# Patient Record
Sex: FEMALE | Race: WHITE | NOT HISPANIC OR LATINO | Employment: FULL TIME | URBAN - METROPOLITAN AREA
[De-identification: names, ages, dates, MRNs, and addresses within clinical notes are randomized per-mention and may not be internally consistent; named-entity substitution may affect disease eponyms.]

---

## 2017-08-04 RX ORDER — VENLAFAXINE HYDROCHLORIDE 150 MG/1
CAPSULE, EXTENDED RELEASE ORAL
Qty: 90 CAPSULE | Refills: 3 | OUTPATIENT
Start: 2017-08-04

## 2017-08-08 ENCOUNTER — OFFICE VISIT (OUTPATIENT)
Dept: FAMILY MEDICINE CLINIC | Facility: CLINIC | Age: 37
End: 2017-08-08

## 2017-08-08 VITALS
TEMPERATURE: 98.6 F | BODY MASS INDEX: 28.19 KG/M2 | DIASTOLIC BLOOD PRESSURE: 80 MMHG | HEART RATE: 80 BPM | WEIGHT: 186 LBS | RESPIRATION RATE: 20 BRPM | SYSTOLIC BLOOD PRESSURE: 124 MMHG | HEIGHT: 68 IN

## 2017-08-08 DIAGNOSIS — F33.40 RECURRENT MAJOR DEPRESSIVE DISORDER, IN REMISSION (HCC): Primary | ICD-10-CM

## 2017-08-08 PROCEDURE — 99213 OFFICE O/P EST LOW 20 MIN: CPT | Performed by: NURSE PRACTITIONER

## 2017-08-08 RX ORDER — LEVOCETIRIZINE DIHYDROCHLORIDE 5 MG/1
5 TABLET, FILM COATED ORAL EVERY EVENING
COMMUNITY

## 2017-08-08 RX ORDER — VENLAFAXINE HYDROCHLORIDE 150 MG/1
150 CAPSULE, EXTENDED RELEASE ORAL DAILY
Qty: 90 CAPSULE | Refills: 4 | Status: SHIPPED | OUTPATIENT
Start: 2017-08-08 | End: 2018-10-30 | Stop reason: SDUPTHER

## 2017-08-08 NOTE — PROGRESS NOTES
Subjective   Hilaria Hanna is a 36 y.o. female.     History of Present Illness   Needs refill on depression medication  Doing well  No SI/HI, sleeping well, not isolating, no excessive fatigue      The following portions of the patient's history were reviewed and updated as appropriate: allergies, current medications, past family history, past medical history, past social history, past surgical history and problem list.    Review of Systems   Constitutional: Negative.    HENT: Negative.    Eyes: Negative.    Respiratory: Negative.    Cardiovascular: Negative.    Gastrointestinal: Negative.    Endocrine: Negative.    Genitourinary: Negative.    Musculoskeletal: Negative.    Skin: Negative.    Allergic/Immunologic: Negative.    Neurological: Negative.    Hematological: Negative.    Psychiatric/Behavioral: Negative.        Objective   Physical Exam   Constitutional: She appears well-developed and well-nourished. No distress.   HENT:   Head: Normocephalic.   Right Ear: External ear normal.   Left Ear: External ear normal.   Nose: Nose normal.   Neck: Normal range of motion. Neck supple. No thyromegaly present.   Cardiovascular: Normal rate, regular rhythm and normal heart sounds.    Pulmonary/Chest: Effort normal and breath sounds normal.   Abdominal: Soft. Normal appearance. There is no hepatosplenomegaly. There is no CVA tenderness.   Lymphadenopathy:     She has no cervical adenopathy.   Neurological: She is alert.   Skin: Skin is warm and dry.   Psychiatric: She has a normal mood and affect. Her behavior is normal. Judgment and thought content normal.   Nursing note and vitals reviewed.      Assessment/Plan   Hilaria was seen today for depression.    Diagnoses and all orders for this visit:    Recurrent major depressive disorder, in remission    Other orders  -     venlafaxine XR (EFFEXOR-XR) 150 MG 24 hr capsule; Take 1 capsule by mouth Daily.    Will refill meds and see pt back in 12 months or sooner if  needed.

## 2017-08-14 RX ORDER — LEVONORGESTREL / ETHINYL ESTRADIOL AND ETHINYL ESTRADIOL 150-30(84)
KIT ORAL
Qty: 91 EACH | Refills: 2 | Status: SHIPPED | OUTPATIENT
Start: 2017-08-14 | End: 2019-05-13

## 2018-10-30 RX ORDER — VENLAFAXINE HYDROCHLORIDE 150 MG/1
CAPSULE, EXTENDED RELEASE ORAL
Qty: 90 CAPSULE | Refills: 1 | Status: SHIPPED | OUTPATIENT
Start: 2018-10-30 | End: 2019-04-28 | Stop reason: SDUPTHER

## 2019-04-30 RX ORDER — VENLAFAXINE HYDROCHLORIDE 150 MG/1
CAPSULE, EXTENDED RELEASE ORAL
Qty: 90 CAPSULE | Refills: 0 | Status: SHIPPED | OUTPATIENT
Start: 2019-04-30 | End: 2019-05-13 | Stop reason: SDUPTHER

## 2019-05-13 ENCOUNTER — OFFICE VISIT (OUTPATIENT)
Dept: FAMILY MEDICINE CLINIC | Facility: CLINIC | Age: 39
End: 2019-05-13

## 2019-05-13 VITALS
SYSTOLIC BLOOD PRESSURE: 112 MMHG | BODY MASS INDEX: 28.11 KG/M2 | RESPIRATION RATE: 16 BRPM | WEIGHT: 185.5 LBS | DIASTOLIC BLOOD PRESSURE: 72 MMHG | HEART RATE: 88 BPM | TEMPERATURE: 97.9 F | HEIGHT: 68 IN

## 2019-05-13 DIAGNOSIS — Z01.419 PAP SMEAR, AS PART OF ROUTINE GYNECOLOGICAL EXAMINATION: ICD-10-CM

## 2019-05-13 DIAGNOSIS — Z13.6 ENCOUNTER FOR LIPID SCREENING FOR CARDIOVASCULAR DISEASE: ICD-10-CM

## 2019-05-13 DIAGNOSIS — F33.40 RECURRENT MAJOR DEPRESSIVE DISORDER, IN REMISSION (HCC): ICD-10-CM

## 2019-05-13 DIAGNOSIS — M25.50 ARTHRALGIA, UNSPECIFIED JOINT: ICD-10-CM

## 2019-05-13 DIAGNOSIS — E55.9 VITAMIN D DEFICIENCY: ICD-10-CM

## 2019-05-13 DIAGNOSIS — R53.82 CHRONIC FATIGUE: ICD-10-CM

## 2019-05-13 DIAGNOSIS — Z13.220 ENCOUNTER FOR LIPID SCREENING FOR CARDIOVASCULAR DISEASE: ICD-10-CM

## 2019-05-13 DIAGNOSIS — F33.1 MODERATE EPISODE OF RECURRENT MAJOR DEPRESSIVE DISORDER (HCC): ICD-10-CM

## 2019-05-13 DIAGNOSIS — Z00.00 ENCOUNTER FOR WELL ADULT EXAM WITHOUT ABNORMAL FINDINGS: Primary | ICD-10-CM

## 2019-05-13 PROCEDURE — 99395 PREV VISIT EST AGE 18-39: CPT | Performed by: PHYSICIAN ASSISTANT

## 2019-05-13 RX ORDER — VENLAFAXINE HYDROCHLORIDE 150 MG/1
150 CAPSULE, EXTENDED RELEASE ORAL DAILY
Qty: 90 CAPSULE | Refills: 1 | Status: SHIPPED | OUTPATIENT
Start: 2019-05-13 | End: 2020-01-27

## 2019-05-13 NOTE — PROGRESS NOTES
Chief Complaint   Patient presents with   • physical and pap     sore joints and fatigue       Subjective   The patient is a 38 y.o. female who presents for annual wellness visit      Nutrition:  well balanced. Vegetarian X 8 years. Takes multivitamin   Exercise:  run, 3 X per week  Sleep: 7-8 hours per night, never feels rested      Past Gynecological History:     Patient's last menstrual period was 04/29/2019.     Menses: regular every 28-30 days, heavy. Lasting 5-6 days.      Contraception: condoms. Was on OCP in the past- did not like how it made her feel      Sexually transmitted disease history: none     Pap History:was done on approximately 3 years ago  and the result was: normal PAP.      Date of last mammogram: she has never had a mammogram    Past Medical History,Medications, Allergies reviewed.       HPI  Joint pain in elbows, knees, shoulders, neck since March. No injury, no strenuous activity. Just feels achy.   Sometimes feels like she is swelling   Family hx of hashimoto's thyroiditis (mother) and lupus (2 cousins and aunt on father's side)   No known hx of RA.  Cycles have been very heavy   Very fatigued     Bad seasonal allergies. But used to this     No change in mood. Has been stable on Effexor. Needs refill     Review of Systems   Constitutional: Positive for fatigue. Negative for chills, diaphoresis and fever.   HENT: Negative.  Negative for congestion, ear discharge, ear pain, hearing loss, nosebleeds, postnasal drip, sinus pressure, sneezing and sore throat.    Eyes: Negative.    Respiratory: Negative.  Negative for cough, chest tightness, shortness of breath and wheezing.    Cardiovascular: Negative.  Negative for chest pain, palpitations and leg swelling.   Gastrointestinal: Negative for abdominal distention, abdominal pain, blood in stool, constipation, diarrhea, nausea and vomiting.   Genitourinary: Negative.  Negative for difficulty urinating, dysuria, flank pain, frequency, hematuria and  "urgency.   Musculoskeletal: Positive for arthralgias. Negative for back pain, gait problem, joint swelling, myalgias, neck pain and neck stiffness.   Skin: Negative.  Negative for color change, pallor, rash and wound.   Allergic/Immunologic: Positive for environmental allergies.   Neurological: Negative for dizziness, syncope, weakness, light-headedness, numbness and headaches.       Objective     /72   Pulse 88   Temp 97.9 °F (36.6 °C)   Resp 16   Ht 172.7 cm (68\")   Wt 84.1 kg (185 lb 8 oz)   LMP 04/29/2019   BMI 28.21 kg/m²     Physical Exam   Constitutional: She is oriented to person, place, and time. She appears well-developed and well-nourished.   HENT:   Head: Normocephalic and atraumatic.   Right Ear: Tympanic membrane, external ear and ear canal normal.   Left Ear: Tympanic membrane, external ear and ear canal normal.   Nose: Nose normal.   Mouth/Throat: Oropharynx is clear and moist. No oropharyngeal exudate.   Eyes: Conjunctivae are normal.   Neck: Normal range of motion. Neck supple. No tracheal deviation present. No thyromegaly present.   Cardiovascular: Normal rate, regular rhythm, normal heart sounds and intact distal pulses. Exam reveals no gallop and no friction rub.   No murmur heard.  Pulmonary/Chest: Effort normal and breath sounds normal. No respiratory distress. She has no wheezes. She has no rales. She exhibits no tenderness. Right breast exhibits no inverted nipple, no mass, no nipple discharge, no skin change and no tenderness. Left breast exhibits no inverted nipple, no mass, no nipple discharge, no skin change and no tenderness.   Breast implants    Abdominal: Soft. Bowel sounds are normal. She exhibits no distension and no mass. There is no tenderness. There is no rebound and no guarding. No hernia. Hernia confirmed negative in the right inguinal area and confirmed negative in the left inguinal area.   Genitourinary: Vagina normal and uterus normal. Pelvic exam was performed " with patient supine. There is no rash, tenderness, lesion or injury on the right labia. There is no rash, tenderness, lesion or injury on the left labia. Cervix exhibits no motion tenderness, no discharge and no friability. Right adnexum displays no mass, no tenderness and no fullness. Left adnexum displays no mass, no tenderness and no fullness.   Genitourinary Comments: Pap smear obtained    Musculoskeletal: Normal range of motion. She exhibits no edema or deformity.        Right shoulder: She exhibits tenderness.        Left shoulder: She exhibits bony tenderness.        Right elbow: Tenderness found.        Left elbow: Tenderness found.        Right knee: Tenderness found.        Left knee: Tenderness found.        Cervical back: She exhibits tenderness.   Lymphadenopathy:     She has no cervical adenopathy. No inguinal adenopathy noted on the right or left side.   Neurological: She is alert and oriented to person, place, and time.   Skin: Skin is warm and dry.   Psychiatric: She has a normal mood and affect. Her behavior is normal. Judgment and thought content normal.   Nursing note and vitals reviewed.      Assessment/Plan     1. Encounter for well adult exam without abnormal findings    2. Recurrent major depressive disorder, in remission (CMS/HCC)    3. Pap smear, as part of routine gynecological examination    4. Vitamin D deficiency  - Vitamin D 25 Hydroxy    5. Chronic fatigue  - CBC & Differential  - Comprehensive Metabolic Panel  - TSH  - T4, free  - Thyroid Antibodies  - Diana-Barr Virus VCA Antibody Panel  - Vitamin B12  - Iron Profile    6. Arthralgia, unspecified joint  - Sedimentation Rate  - C-reactive Protein  - Rheumatoid Factor, Quant  - JOÃO    7. Encounter for lipid screening for cardiovascular disease  - Lipid Panel    8. Moderate episode of recurrent major depressive disorder (CMS/HCC)  - venlafaxine XR (EFFEXOR-XR) 150 MG 24 hr capsule; Take 1 capsule by mouth Daily.  Dispense: 90  capsule; Refill: 1      Counseling was given to patient for the following topics: instructions for management, risk factor reductions, patient and family education and impressions . Total time of the encounter was 30 minutes and 15 minutes was spend counseling.     Labs as outlined in plan. F/U pending imaging   YAEL Monroy

## 2019-05-14 LAB
25(OH)D3+25(OH)D2 SERPL-MCNC: 64.9 NG/ML (ref 30–100)
ALBUMIN SERPL-MCNC: 4.7 G/DL (ref 3.5–5.2)
ALBUMIN/GLOB SERPL: 1.8 G/DL
ALP SERPL-CCNC: 47 U/L (ref 39–117)
ALT SERPL-CCNC: 17 U/L (ref 1–33)
ANA SER QL: NEGATIVE
AST SERPL-CCNC: 21 U/L (ref 1–32)
BASOPHILS # BLD AUTO: 0.06 10*3/MM3 (ref 0–0.2)
BASOPHILS NFR BLD AUTO: 0.6 % (ref 0–1.5)
BILIRUB SERPL-MCNC: 0.3 MG/DL (ref 0.2–1.2)
BUN SERPL-MCNC: 7 MG/DL (ref 6–20)
BUN/CREAT SERPL: 8.3 (ref 7–25)
CALCIUM SERPL-MCNC: 10.2 MG/DL (ref 8.6–10.5)
CHLORIDE SERPL-SCNC: 99 MMOL/L (ref 98–107)
CHOLEST SERPL-MCNC: 170 MG/DL (ref 0–200)
CO2 SERPL-SCNC: 26.8 MMOL/L (ref 22–29)
CREAT SERPL-MCNC: 0.84 MG/DL (ref 0.57–1)
CRP SERPL-MCNC: 0.06 MG/DL (ref 0–0.5)
EBV EA IGG SER-ACNC: 72.1 U/ML (ref 0–8.9)
EBV NA IGG SER IA-ACNC: <18 U/ML (ref 0–17.9)
EBV VCA IGG SER IA-ACNC: 227 U/ML (ref 0–17.9)
EBV VCA IGM SER IA-ACNC: <36 U/ML (ref 0–35.9)
EOSINOPHIL # BLD AUTO: 0.2 10*3/MM3 (ref 0–0.4)
EOSINOPHIL NFR BLD AUTO: 2.1 % (ref 0.3–6.2)
ERYTHROCYTE [DISTWIDTH] IN BLOOD BY AUTOMATED COUNT: 12.2 % (ref 12.3–15.4)
ERYTHROCYTE [SEDIMENTATION RATE] IN BLOOD BY WESTERGREN METHOD: 4 MM/HR (ref 0–20)
GLOBULIN SER CALC-MCNC: 2.6 GM/DL
GLUCOSE SERPL-MCNC: 89 MG/DL (ref 65–99)
HCT VFR BLD AUTO: 39.6 % (ref 34–46.6)
HDLC SERPL-MCNC: 89 MG/DL (ref 40–60)
HGB BLD-MCNC: 12.9 G/DL (ref 12–15.9)
IMM GRANULOCYTES # BLD AUTO: 0.02 10*3/MM3 (ref 0–0.05)
IMM GRANULOCYTES NFR BLD AUTO: 0.2 % (ref 0–0.5)
IRON SATN MFR SERPL: 18 % (ref 20–50)
IRON SERPL-MCNC: 90 MCG/DL (ref 37–145)
LDLC SERPL CALC-MCNC: 63 MG/DL (ref 0–100)
LYMPHOCYTES # BLD AUTO: 2.49 10*3/MM3 (ref 0.7–3.1)
LYMPHOCYTES NFR BLD AUTO: 26.5 % (ref 19.6–45.3)
MCH RBC QN AUTO: 29.9 PG (ref 26.6–33)
MCHC RBC AUTO-ENTMCNC: 32.6 G/DL (ref 31.5–35.7)
MCV RBC AUTO: 91.7 FL (ref 79–97)
MONOCYTES # BLD AUTO: 0.57 10*3/MM3 (ref 0.1–0.9)
MONOCYTES NFR BLD AUTO: 6.1 % (ref 5–12)
NEUTROPHILS # BLD AUTO: 6.04 10*3/MM3 (ref 1.7–7)
NEUTROPHILS NFR BLD AUTO: 64.5 % (ref 42.7–76)
NRBC BLD AUTO-RTO: 0 /100 WBC (ref 0–0.2)
PLATELET # BLD AUTO: 451 10*3/MM3 (ref 140–450)
POTASSIUM SERPL-SCNC: 3.9 MMOL/L (ref 3.5–5.2)
PROT SERPL-MCNC: 7.3 G/DL (ref 6–8.5)
RBC # BLD AUTO: 4.32 10*6/MM3 (ref 3.77–5.28)
RHEUMATOID FACT SERPL-ACNC: <10 IU/ML (ref 0–13.9)
SERVICE CMNT-IMP: ABNORMAL
SODIUM SERPL-SCNC: 136 MMOL/L (ref 136–145)
T4 FREE SERPL-MCNC: 1.15 NG/DL (ref 0.93–1.7)
THYROGLOB AB SERPL-ACNC: <1 IU/ML (ref 0–0.9)
THYROPEROXIDASE AB SERPL-ACNC: 12 IU/ML (ref 0–34)
TIBC SERPL-MCNC: 490 MCG/DL
TRIGL SERPL-MCNC: 90 MG/DL (ref 0–150)
TSH SERPL DL<=0.005 MIU/L-ACNC: 2.27 UIU/ML (ref 0.45–4.5)
UIBC SERPL-MCNC: 400 MCG/DL (ref 112–346)
VIT B12 SERPL-MCNC: 650 PG/ML (ref 211–946)
VLDLC SERPL CALC-MCNC: 18 MG/DL
WBC # BLD AUTO: 9.38 10*3/MM3 (ref 3.4–10.8)

## 2019-05-14 NOTE — PROGRESS NOTES
I have reviewed the notes, assessments, and/or procedures performed by YAEL Monroy, I concur with her/his documentation of Hilaria Hanna.

## 2019-12-03 ENCOUNTER — TELEPHONE (OUTPATIENT)
Dept: FAMILY MEDICINE CLINIC | Facility: CLINIC | Age: 39
End: 2019-12-03

## 2019-12-03 DIAGNOSIS — M25.50 MULTIPLE JOINT PAIN: Primary | ICD-10-CM

## 2019-12-03 NOTE — TELEPHONE ENCOUNTER
GEO      PT CALLED IN STATING SHE HAS HAD A LOT OF JOINT PAIN; SHE WAS SEEN PREVIOUS FOR THIS BUT SHE WOULD LIKE TO HAVE A REFERRAL SENT TO THE [facility] FOR HER JOINT PAIN.       CALL BACK   [phone]

## 2020-01-27 DIAGNOSIS — F33.1 MODERATE EPISODE OF RECURRENT MAJOR DEPRESSIVE DISORDER (HCC): ICD-10-CM

## 2020-01-27 RX ORDER — VENLAFAXINE HYDROCHLORIDE 150 MG/1
CAPSULE, EXTENDED RELEASE ORAL
Qty: 90 CAPSULE | Refills: 1 | Status: SHIPPED | OUTPATIENT
Start: 2020-01-27 | End: 2020-08-03

## 2020-08-02 DIAGNOSIS — F33.1 MODERATE EPISODE OF RECURRENT MAJOR DEPRESSIVE DISORDER (HCC): ICD-10-CM

## 2020-08-03 RX ORDER — VENLAFAXINE HYDROCHLORIDE 150 MG/1
CAPSULE, EXTENDED RELEASE ORAL
Qty: 30 CAPSULE | Refills: 0 | Status: SHIPPED | OUTPATIENT
Start: 2020-08-03 | End: 2020-09-08

## 2020-09-06 DIAGNOSIS — F33.1 MODERATE EPISODE OF RECURRENT MAJOR DEPRESSIVE DISORDER (HCC): ICD-10-CM

## 2020-09-08 RX ORDER — VENLAFAXINE HYDROCHLORIDE 150 MG/1
CAPSULE, EXTENDED RELEASE ORAL
Qty: 90 CAPSULE | Refills: 0 | Status: SHIPPED | OUTPATIENT
Start: 2020-09-08 | End: 2020-12-16 | Stop reason: SDUPTHER

## 2020-12-15 DIAGNOSIS — F33.1 MODERATE EPISODE OF RECURRENT MAJOR DEPRESSIVE DISORDER (HCC): ICD-10-CM

## 2020-12-15 RX ORDER — VENLAFAXINE HYDROCHLORIDE 150 MG/1
CAPSULE, EXTENDED RELEASE ORAL
Qty: 90 CAPSULE | Refills: 0 | OUTPATIENT
Start: 2020-12-15

## 2020-12-16 ENCOUNTER — OFFICE VISIT (OUTPATIENT)
Dept: FAMILY MEDICINE CLINIC | Facility: CLINIC | Age: 40
End: 2020-12-16

## 2020-12-16 DIAGNOSIS — F33.1 MODERATE EPISODE OF RECURRENT MAJOR DEPRESSIVE DISORDER (HCC): ICD-10-CM

## 2020-12-16 PROCEDURE — 99441 PR PHYS/QHP TELEPHONE EVALUATION 5-10 MIN: CPT | Performed by: PHYSICIAN ASSISTANT

## 2020-12-16 RX ORDER — VENLAFAXINE HYDROCHLORIDE 150 MG/1
150 CAPSULE, EXTENDED RELEASE ORAL DAILY
Qty: 90 CAPSULE | Refills: 1 | Status: SHIPPED | OUTPATIENT
Start: 2020-12-16 | End: 2021-06-07

## 2020-12-16 NOTE — PROGRESS NOTES
Subjective   Hilaria Hanna is a 40 y.o. female.     You have chosen to receive care through a telephone visit. Do you consent to use a telephone visit for your medical care today? Yes    History of Present Illness   Pt presents for telephone visit for refill on her effexor for depression  Has not been seen in office in over 1 year   Tolerating medication well. Has been on for several years.   Denies side effects  Controlling symptoms of depression well   Denies any additional concerns at this time     The following portions of the patient's history were reviewed and updated as appropriate: allergies, current medications, past family history, past medical history, past social history, past surgical history and problem list.    Review of Systems   Constitutional: Negative for chills and fever.   HENT: Negative for congestion, ear pain, postnasal drip, sinus pressure and trouble swallowing.    Respiratory: Negative for cough and shortness of breath.    Cardiovascular: Negative for chest pain.   Gastrointestinal: Negative for nausea and vomiting.   Skin: Negative for rash.   Psychiatric/Behavioral: Negative for dysphoric mood and sleep disturbance. The patient is not nervous/anxious.        Objective   Physical Exam  Physical exam unable to obtain due to telephone visit     Assessment/Plan   Diagnoses and all orders for this visit:    1. Moderate episode of recurrent major depressive disorder (CMS/HCC)  -     venlafaxine XR (EFFEXOR-XR) 150 MG 24 hr capsule; Take 1 capsule by mouth Daily.  Dispense: 90 capsule; Refill: 1    6 month refill provided. Follow up in office after this time for annual exam. Pt agrees    This visit has been rescheduled as a phone visit to comply with patient safety concerns in accordance with CDC recommendations. Total time of discussion was 10 minutes.

## 2021-06-05 DIAGNOSIS — F33.1 MODERATE EPISODE OF RECURRENT MAJOR DEPRESSIVE DISORDER (HCC): ICD-10-CM

## 2021-06-07 RX ORDER — VENLAFAXINE HYDROCHLORIDE 150 MG/1
CAPSULE, EXTENDED RELEASE ORAL
Qty: 90 CAPSULE | Refills: 0 | Status: SHIPPED | OUTPATIENT
Start: 2021-06-07 | End: 2021-08-02 | Stop reason: SDUPTHER

## 2021-08-02 ENCOUNTER — OFFICE VISIT (OUTPATIENT)
Dept: FAMILY MEDICINE CLINIC | Facility: CLINIC | Age: 41
End: 2021-08-02

## 2021-08-02 VITALS
WEIGHT: 184 LBS | BODY MASS INDEX: 27.89 KG/M2 | SYSTOLIC BLOOD PRESSURE: 102 MMHG | HEART RATE: 68 BPM | HEIGHT: 68 IN | TEMPERATURE: 97.8 F | DIASTOLIC BLOOD PRESSURE: 70 MMHG | RESPIRATION RATE: 18 BRPM

## 2021-08-02 DIAGNOSIS — Z12.31 ENCOUNTER FOR SCREENING MAMMOGRAM FOR MALIGNANT NEOPLASM OF BREAST: ICD-10-CM

## 2021-08-02 DIAGNOSIS — Z11.59 NEED FOR HEPATITIS C SCREENING TEST: ICD-10-CM

## 2021-08-02 DIAGNOSIS — E55.9 VITAMIN D DEFICIENCY: ICD-10-CM

## 2021-08-02 DIAGNOSIS — Z00.00 ENCOUNTER FOR WELL ADULT EXAM WITHOUT ABNORMAL FINDINGS: Primary | ICD-10-CM

## 2021-08-02 DIAGNOSIS — Z13.220 ENCOUNTER FOR LIPID SCREENING FOR CARDIOVASCULAR DISEASE: ICD-10-CM

## 2021-08-02 DIAGNOSIS — F33.1 MODERATE EPISODE OF RECURRENT MAJOR DEPRESSIVE DISORDER (HCC): ICD-10-CM

## 2021-08-02 DIAGNOSIS — R53.82 CHRONIC FATIGUE: ICD-10-CM

## 2021-08-02 DIAGNOSIS — L70.0 CYSTIC ACNE: ICD-10-CM

## 2021-08-02 DIAGNOSIS — Z13.6 ENCOUNTER FOR LIPID SCREENING FOR CARDIOVASCULAR DISEASE: ICD-10-CM

## 2021-08-02 PROCEDURE — 99396 PREV VISIT EST AGE 40-64: CPT | Performed by: PHYSICIAN ASSISTANT

## 2021-08-02 RX ORDER — TRETINOIN 1 MG/G
CREAM TOPICAL NIGHTLY
Qty: 20 G | Refills: 5 | Status: SHIPPED | OUTPATIENT
Start: 2021-08-02

## 2021-08-02 RX ORDER — VENLAFAXINE HYDROCHLORIDE 150 MG/1
150 CAPSULE, EXTENDED RELEASE ORAL DAILY
Qty: 90 CAPSULE | Refills: 3 | Status: SHIPPED | OUTPATIENT
Start: 2021-08-02 | End: 2022-08-22

## 2021-08-02 NOTE — PROGRESS NOTES
"Chief Complaint   Patient presents with   • Annual Exam     f/u    • Med Refill       Subjective   The patient is a 40 y.o. female who presents for annual exam      Nutrition:  well balanced   Exercise:  regular, cardio/ weights   Sleep: doing well, no concerns        Past Gynecological History:     LMP: hx of ablation. No bleeding     Menses: regular every 28-30 days     Contraception: No method at present time.     Pap History:was done on approximately 1 year ago  and the result was: normal PAP.     Date of last mammogram: she has never had a mammogram    Past Medical History,Medications, Allergies reviewed.     HPI  Concern for chronic mono  In the past. Would like levels checked  Pt agreeable to additional labs as well    Tretinoin rx request for cystic acne. Using OTC and working well     F/u for anxiety. Doing well on Effexor. Will be due for refills soon. Tolerating well. No interest in coming off of medication   States strong family hx of depression     Review of Systems   Constitutional: Positive for fatigue. Negative for chills and fever.   HENT: Negative for congestion, ear pain, postnasal drip, sinus pressure and trouble swallowing.    Respiratory: Negative for cough, shortness of breath and wheezing.    Cardiovascular: Negative for chest pain and leg swelling.   Gastrointestinal: Negative for diarrhea, nausea and vomiting.   Genitourinary: Negative for difficulty urinating.   Skin: Negative for rash.   Neurological: Negative for dizziness, light-headedness and headaches.   Psychiatric/Behavioral: Negative for dysphoric mood and sleep disturbance. The patient is not nervous/anxious.        Objective     /70   Pulse 68   Temp 97.8 °F (36.6 °C)   Resp 18   Ht 172.7 cm (68\")   Wt 83.5 kg (184 lb)   BMI 27.98 kg/m²     Physical Exam  Vitals and nursing note reviewed.   Constitutional:       Appearance: Normal appearance. She is well-developed.   HENT:      Head: Normocephalic and atraumatic.      " Right Ear: Tympanic membrane, ear canal and external ear normal.      Left Ear: Tympanic membrane, ear canal and external ear normal.      Nose: Nose normal.      Mouth/Throat:      Pharynx: No oropharyngeal exudate or posterior oropharyngeal erythema.   Eyes:      Conjunctiva/sclera: Conjunctivae normal.   Neck:      Thyroid: No thyromegaly.      Trachea: No tracheal deviation.   Cardiovascular:      Rate and Rhythm: Normal rate and regular rhythm.      Heart sounds: Normal heart sounds. No murmur heard.   No friction rub. No gallop.    Pulmonary:      Effort: Pulmonary effort is normal. No respiratory distress.      Breath sounds: Normal breath sounds. No wheezing or rales.   Chest:      Chest wall: No tenderness.   Abdominal:      General: Bowel sounds are normal. There is no distension.      Palpations: Abdomen is soft. There is no mass.      Tenderness: There is no abdominal tenderness. There is no guarding or rebound.      Hernia: No hernia is present.   Musculoskeletal:      Cervical back: Normal range of motion and neck supple.   Lymphadenopathy:      Cervical: No cervical adenopathy.   Skin:     General: Skin is warm and dry.   Neurological:      Mental Status: She is alert and oriented to person, place, and time.   Psychiatric:         Mood and Affect: Mood normal.         Behavior: Behavior normal.         Thought Content: Thought content normal.         Judgment: Judgment normal.         Assessment/Plan     1. Encounter for well adult exam without abnormal findings  - CBC w AUTO Differential  - Comprehensive Metabolic Panel  - Lipid Panel  - TSH  - T4, free  - T3, free  - Vitamin D 25 hydroxy  - Iron Profile  - Vitamin B12  - Hepatitis C antibody  - EBV Antibody Profile    2. Cystic acne  - tretinoin (RETIN-A) 0.1 % cream; Apply  topically to the appropriate area as directed Every Night.  Dispense: 20 g; Refill: 5    3. Moderate episode of recurrent major depressive disorder (CMS/HCC)  - venlafaxine XR  (EFFEXOR-XR) 150 MG 24 hr capsule; Take 1 capsule by mouth Daily.  Dispense: 90 capsule; Refill: 3    4. Encounter for screening mammogram for malignant neoplasm of breast  - Mammo screening digital tomosynthesis bilateral w CAD; Future    5. Chronic fatigue  - CBC w AUTO Differential  - Comprehensive Metabolic Panel  - TSH  - T4, free  - T3, free  - Iron Profile  - Vitamin B12  - EBV Antibody Profile    6. Need for hepatitis C screening test  - Hepatitis C antibody    7. Vitamin D deficiency  - Vitamin D 25 hydroxy    8. Encounter for lipid screening for cardiovascular disease  - Lipid Panel    Labs as outlined in plan   Continue effexor as noted   rx for tretinoin provided for acne. Wear sunscreen and protect face from sun when outside   Mammogram ordered. This will be her first screening     Counseling was given to patient for the following topics: instructions for management, patient and family education, impressions and risks and benefits of treatment options . Total time of the encounter was 30 minutes and 15 minutes was spent counseling.        YAEL Monroy

## 2021-08-03 LAB
25(OH)D3+25(OH)D2 SERPL-MCNC: 43.3 NG/ML (ref 30–100)
ALBUMIN SERPL-MCNC: 4.7 G/DL (ref 3.8–4.8)
ALBUMIN/GLOB SERPL: 1.8 {RATIO} (ref 1.2–2.2)
ALP SERPL-CCNC: 53 IU/L (ref 48–121)
ALT SERPL-CCNC: 15 IU/L (ref 0–32)
AST SERPL-CCNC: 20 IU/L (ref 0–40)
BASOPHILS # BLD AUTO: 0.1 X10E3/UL (ref 0–0.2)
BASOPHILS NFR BLD AUTO: 1 %
BILIRUB SERPL-MCNC: 0.5 MG/DL (ref 0–1.2)
BUN SERPL-MCNC: 9 MG/DL (ref 6–24)
BUN/CREAT SERPL: 10 (ref 9–23)
CALCIUM SERPL-MCNC: 9.5 MG/DL (ref 8.7–10.2)
CHLORIDE SERPL-SCNC: 103 MMOL/L (ref 96–106)
CHOLEST SERPL-MCNC: 188 MG/DL (ref 100–199)
CO2 SERPL-SCNC: 24 MMOL/L (ref 20–29)
CREAT SERPL-MCNC: 0.87 MG/DL (ref 0.57–1)
EBV NA IGG SER IA-ACNC: <18 U/ML (ref 0–17.9)
EBV VCA IGG SER IA-ACNC: 237 U/ML (ref 0–17.9)
EBV VCA IGM SER IA-ACNC: <36 U/ML (ref 0–35.9)
EOSINOPHIL # BLD AUTO: 0.1 X10E3/UL (ref 0–0.4)
EOSINOPHIL NFR BLD AUTO: 2 %
ERYTHROCYTE [DISTWIDTH] IN BLOOD BY AUTOMATED COUNT: 12.3 % (ref 11.7–15.4)
GLOBULIN SER CALC-MCNC: 2.6 G/DL (ref 1.5–4.5)
GLUCOSE SERPL-MCNC: 48 MG/DL (ref 65–99)
HCT VFR BLD AUTO: 40.7 % (ref 34–46.6)
HCV AB S/CO SERPL IA: <0.1 S/CO RATIO (ref 0–0.9)
HDLC SERPL-MCNC: 72 MG/DL
HGB BLD-MCNC: 13.7 G/DL (ref 11.1–15.9)
IMM GRANULOCYTES # BLD AUTO: 0 X10E3/UL (ref 0–0.1)
IMM GRANULOCYTES NFR BLD AUTO: 0 %
IRON SATN MFR SERPL: 30 % (ref 15–55)
IRON SERPL-MCNC: 111 UG/DL (ref 27–159)
LDLC SERPL CALC-MCNC: 99 MG/DL (ref 0–99)
LYMPHOCYTES # BLD AUTO: 1.7 X10E3/UL (ref 0.7–3.1)
LYMPHOCYTES NFR BLD AUTO: 28 %
MCH RBC QN AUTO: 31.2 PG (ref 26.6–33)
MCHC RBC AUTO-ENTMCNC: 33.7 G/DL (ref 31.5–35.7)
MCV RBC AUTO: 93 FL (ref 79–97)
MONOCYTES # BLD AUTO: 0.4 X10E3/UL (ref 0.1–0.9)
MONOCYTES NFR BLD AUTO: 7 %
NEUTROPHILS # BLD AUTO: 3.6 X10E3/UL (ref 1.4–7)
NEUTROPHILS NFR BLD AUTO: 62 %
PLATELET # BLD AUTO: 434 X10E3/UL (ref 150–450)
POTASSIUM SERPL-SCNC: 4.3 MMOL/L (ref 3.5–5.2)
PROT SERPL-MCNC: 7.3 G/DL (ref 6–8.5)
RBC # BLD AUTO: 4.39 X10E6/UL (ref 3.77–5.28)
SERVICE CMNT-IMP: ABNORMAL
SODIUM SERPL-SCNC: 141 MMOL/L (ref 134–144)
T3FREE SERPL-MCNC: 2.6 PG/ML (ref 2–4.4)
T4 FREE SERPL-MCNC: 1.28 NG/DL (ref 0.82–1.77)
TIBC SERPL-MCNC: 372 UG/DL (ref 250–450)
TRIGL SERPL-MCNC: 95 MG/DL (ref 0–149)
TSH SERPL DL<=0.005 MIU/L-ACNC: 2.23 UIU/ML (ref 0.45–4.5)
UIBC SERPL-MCNC: 261 UG/DL (ref 131–425)
VIT B12 SERPL-MCNC: 751 PG/ML (ref 232–1245)
VLDLC SERPL CALC-MCNC: 17 MG/DL (ref 5–40)
WBC # BLD AUTO: 5.9 X10E3/UL (ref 3.4–10.8)

## 2021-08-05 LAB
HBA1C MFR BLD: 5.4 % (ref 4.8–5.6)
Lab: NORMAL
WRITTEN AUTHORIZATION: NORMAL

## 2021-10-15 ENCOUNTER — HOSPITAL ENCOUNTER (OUTPATIENT)
Dept: MAMMOGRAPHY | Facility: HOSPITAL | Age: 41
Discharge: HOME | End: 2021-10-15
Admitting: PHYSICIAN ASSISTANT

## 2021-10-15 DIAGNOSIS — Z12.31 ENCOUNTER FOR SCREENING MAMMOGRAM FOR MALIGNANT NEOPLASM OF BREAST: ICD-10-CM

## 2021-10-15 PROCEDURE — 77067 SCR MAMMO BI INCL CAD: CPT | Performed by: RADIOLOGY

## 2021-10-15 PROCEDURE — 77067 SCR MAMMO BI INCL CAD: CPT

## 2021-10-15 PROCEDURE — 77063 BREAST TOMOSYNTHESIS BI: CPT

## 2021-10-15 PROCEDURE — 77063 BREAST TOMOSYNTHESIS BI: CPT | Performed by: RADIOLOGY

## 2022-04-28 ENCOUNTER — OFFICE VISIT (OUTPATIENT)
Dept: FAMILY MEDICINE CLINIC | Facility: CLINIC | Age: 42
End: 2022-04-28

## 2022-04-28 VITALS
RESPIRATION RATE: 20 BRPM | OXYGEN SATURATION: 99 % | WEIGHT: 184 LBS | DIASTOLIC BLOOD PRESSURE: 68 MMHG | TEMPERATURE: 98.2 F | HEIGHT: 68 IN | SYSTOLIC BLOOD PRESSURE: 124 MMHG | HEART RATE: 93 BPM | BODY MASS INDEX: 27.89 KG/M2

## 2022-04-28 DIAGNOSIS — M25.562 ACUTE PAIN OF LEFT KNEE: Primary | ICD-10-CM

## 2022-04-28 PROCEDURE — 99213 OFFICE O/P EST LOW 20 MIN: CPT | Performed by: PHYSICIAN ASSISTANT

## 2022-04-28 RX ORDER — TIZANIDINE 4 MG/1
4 TABLET ORAL NIGHTLY PRN
Qty: 30 TABLET | Refills: 0 | Status: SHIPPED | OUTPATIENT
Start: 2022-04-28

## 2022-04-28 RX ORDER — PREDNISONE 10 MG/1
TABLET ORAL
Qty: 21 EACH | Refills: 0 | Status: SHIPPED | OUTPATIENT
Start: 2022-04-28

## 2022-04-28 NOTE — PROGRESS NOTES
"Subjective   Hilaria Hanna is a 41 y.o. female.     History of Present Illness   L knee pain X 6 weeks    All around and behind the knee   Has tried rest X 1 week, brace, and NSAID with little relief   Pain doing a little better today   Lots of popping and cracking of knee. No falls. No swelling. Does not feel like knee is catching or is going to buckle    Pt is a runner and running approximately 25 miles per week     Notes she has been having more bloating. No diarrhea. Bowel movements are fairly regularly. Notes her stools smell like \"cereal\". Concerned about c diff after looking up online     The following portions of the patient's history were reviewed and updated as appropriate: allergies, current medications, past family history, past medical history, past social history, past surgical history and problem list.    Review of Systems   Constitutional: Negative for chills and fever.   Respiratory: Negative for cough, shortness of breath and wheezing.    Cardiovascular: Negative for chest pain.   Gastrointestinal: Positive for abdominal distention. Negative for anal bleeding, blood in stool, constipation, diarrhea, nausea and vomiting.   Musculoskeletal: Positive for arthralgias. Negative for gait problem and joint swelling.       Objective    Blood pressure 124/68, pulse 93, temperature 98.2 °F (36.8 °C), resp. rate 20, height 172.7 cm (68\"), weight 83.5 kg (184 lb), SpO2 99 %.     Physical Exam  Vitals and nursing note reviewed.   Constitutional:       Appearance: Normal appearance.   HENT:      Head: Normocephalic.      Right Ear: External ear normal.      Left Ear: External ear normal.      Nose: Nose normal.   Eyes:      Conjunctiva/sclera: Conjunctivae normal.   Cardiovascular:      Rate and Rhythm: Normal rate.   Pulmonary:      Effort: Pulmonary effort is normal. No respiratory distress.   Abdominal:      General: There is no distension.      Palpations: There is no mass.      Tenderness: There is no " abdominal tenderness.   Musculoskeletal:      Left knee: No swelling or erythema. Normal range of motion. Tenderness present. No LCL laxity, MCL laxity or ACL laxity.Normal alignment.   Skin:     General: Skin is warm and dry.   Neurological:      Mental Status: She is alert and oriented to person, place, and time.   Psychiatric:         Mood and Affect: Mood normal.         Behavior: Behavior normal.         Thought Content: Thought content normal.         Judgment: Judgment normal.         Assessment/Plan   Diagnoses and all orders for this visit:    1. Acute pain of left knee (Primary)  -     XR Knee 3 View Left  -     predniSONE (DELTASONE) 10 MG (21) dose pack; Use as directed on package  Dispense: 21 each; Refill: 0  -     tiZANidine (ZANAFLEX) 4 MG tablet; Take 1 tablet by mouth At Night As Needed for Muscle Spasms.  Dispense: 30 tablet; Refill: 0    proceed with XR of L knee.   Steroid taper and muscle relaxer at bedtime prn   Rest from running X 2 weeks   Follow up if not improving and will proceed with formal PT and possible MRI. Pt voiced understanding     Reassurance provided, her symptoms are not consistent with C diff infection. Encourage probiotic over the counter. Follow up if any new or worsening symptoms develop

## 2022-08-08 ENCOUNTER — TELEPHONE (OUTPATIENT)
Dept: FAMILY MEDICINE CLINIC | Facility: CLINIC | Age: 42
End: 2022-08-08

## 2022-08-08 NOTE — TELEPHONE ENCOUNTER
Forms are wanting a full physical exam with updated vitals. Please transfer up front and I can work her in this week for annual exam. Okay if in a 15 minute slot if no 30 minutes available or if I have a 5 pm slot available may come in person for this (usually telehealth visit slot but will need to come in person for this)

## 2022-08-08 NOTE — TELEPHONE ENCOUNTER
Caller: Hilaria Hanna    Relationship: Self    Best call back number: [phone]      What was the call regarding: PATIENT WAS HIRED BY [facility] [employer] AND THEY ARE REQUESTING A FORM TO BE FILLED OUT BEFORE SHE CAN START.      PATIENT STATED SHE WAS SEEN BY JAMISON IN April.    Do you require a callback: REQUESTS CALL BACK AS SOON AS POSSIBLE

## 2022-08-08 NOTE — TELEPHONE ENCOUNTER
Need to know what is on the form to see if I can complete this without an appointment. She is due for her annual physical this month

## 2022-08-21 DIAGNOSIS — F33.1 MODERATE EPISODE OF RECURRENT MAJOR DEPRESSIVE DISORDER: ICD-10-CM

## 2022-08-22 RX ORDER — VENLAFAXINE HYDROCHLORIDE 150 MG/1
150 CAPSULE, EXTENDED RELEASE ORAL DAILY
Qty: 90 CAPSULE | Refills: 0 | Status: SHIPPED | OUTPATIENT
Start: 2022-08-22 | End: 2022-12-07

## 2022-12-07 DIAGNOSIS — F33.1 MODERATE EPISODE OF RECURRENT MAJOR DEPRESSIVE DISORDER: ICD-10-CM

## 2022-12-07 RX ORDER — VENLAFAXINE HYDROCHLORIDE 150 MG/1
CAPSULE, EXTENDED RELEASE ORAL
Qty: 90 CAPSULE | Refills: 0 | Status: SHIPPED | OUTPATIENT
Start: 2022-12-07 | End: 2023-03-06

## 2023-03-06 DIAGNOSIS — F33.1 MODERATE EPISODE OF RECURRENT MAJOR DEPRESSIVE DISORDER: ICD-10-CM

## 2023-03-06 RX ORDER — VENLAFAXINE HYDROCHLORIDE 150 MG/1
CAPSULE, EXTENDED RELEASE ORAL
Qty: 90 CAPSULE | Refills: 0 | Status: SHIPPED | OUTPATIENT
Start: 2023-03-06

## 2023-06-04 DIAGNOSIS — F33.1 MODERATE EPISODE OF RECURRENT MAJOR DEPRESSIVE DISORDER: ICD-10-CM

## 2023-06-05 ENCOUNTER — E-VISIT (OUTPATIENT)
Dept: FAMILY MEDICINE CLINIC | Facility: TELEHEALTH | Age: 43
End: 2023-06-05
Payer: COMMERCIAL

## 2023-06-05 RX ORDER — VENLAFAXINE HYDROCHLORIDE 150 MG/1
CAPSULE, EXTENDED RELEASE ORAL
Qty: 90 CAPSULE | Refills: 0 | OUTPATIENT
Start: 2023-06-05

## 2023-06-05 NOTE — E-VISIT TREATED
Chief Complaint: Anxiety, Depression, Stress   Patient introduction   Patient is 42-year-old female presenting with mood symptoms. Patient has had current symptoms for more than a year.   Patient-submitted comments explaining reason for visit: I need my medicine refilled .   Patient did not request an excuse note.   No recent unusual stress from their home situation, family, personal relationships, work, finances, COVID-19, or current news and events.   Depression screening   PHQ-9. Response options are: Not at all (0), On several days (1), More than half the days (2), or Nearly every day (3).   Over the past 2 weeks, patient has been bothered:    (1) On several days by having little interest or pleasure in doing things    (2) On more than half the days by depressed mood    (2) On more than half the days by sleep disturbance    (1) On several days by fatigue or lethargy    (2) On more than half the days by change in appetite    (2) On more than half the days by feelings of worthlessness or excessive guilt    (1) On several days by poor concentration    (2) On more than half the days by observable restlessness or slowness in movement    (0) Not at all by thoughts of hurting themselves or that they would be better off dead   The above problems have made it somewhat difficult to work, function at home, or get along with other people.   Score: 13. Interpretation: 0 to 4: None to minimal. 5 to 9: Mild depression. 10 to 14: Major Depressive Disorder, Mild. 15 to 19: Major Depressive Disorder, Moderately Severe. 20 to 27: Major Depressive Disorder, Severe.   Anxiety screening   ELISE-7. Response options are: Not at all (0), On several days (1), More than half the days (2), or Nearly every day (3)   Over the past 2 weeks, patient has been bothered:    (1) On several days by feeling nervous, anxious, or on edge    (0) Not at all by not being able to stop or control worrying    (0) Not at all by worrying too much about  different things    (0) Not at all by having trouble relaxing    (0) Not at all by being so restless that it is hard to sit still    (1) On several days by becoming easily annoyed or irritable    (0) Not at all by feeling afraid, as if something awful might happen   The above problems have made it not difficult at all to work, function at home, or get along with other people.   Score: 2. Interpretation: 0 to 4: None to minimal. 5 to 9: Mild anxiety. 10 to 14: Moderate anxiety. 15 to 21: Severe anxiety.   Suicide risk screening   Score: Negative screen (based on PHQ-9 responses above).   Action taken based on risk:    Negative screen: Patient completed interview.    Low risk: Patient completed interview. Follow-up per provider discretion.    Moderate risk: Recommended to call [phone] or [phone] or to go to their nearest ER. Patient given option to continue with the interview if those options are not relevant at this time. Follow-up per provider discretion.    High risk: Interview terminated. Recommended to go to ER or to call [phone] or [phone].   Repetitive thoughts and behaviors screening   DSM-5 Level 1 Cross-Cutting Symptom Measure, Section X. 2 items. Response options are: Not at all (0), Rarely (1), Several days (2), More than half the days (3), or Nearly every day (4)   Over the past 2 weeks, patient has been bothered:    (0) Not at all by unpleasant thoughts, urges, or images that repeatedly enter their mind    (0) Not at all by feeling driven to repeat certain behaviors or mental acts   Score: 0. Interpretation: 0 to 2 (with 0 to 1 on both items): Negative screen. 2 or higher (with 2 or higher on either item): Positive screen.   Barbara/hypomania screening   DSM-5 Level 1 Cross-Cutting Symptom Measure, Section III. 2 items. Response options are: Not at all (0), Rarely (1), Several days (2), More than half the days (3), or Nearly every day (4)   Over the past 2 weeks, patient has been bothered:    (0) Not at all by sleeping  less than usual, but still having a lot of energy    (0) Not at all by starting lots more projects than usual or doing more risky things than usual   Score: 0. Interpretation: 0 to 2 (with 1 on both items): Negative screen. 2 or higher (with 2 or higher on at least 1 item): Positive screen; in-interview follow-up with Leona Self-Rating Barbara (ASRM) Scale.   Psychosis/hallucination screening   DSM-5 Level 1 Cross-Cutting Symptom Measure, Section VII. 2 items. Response options are: Not at all (0), Rarely (1), Several days (2), More than half the days (3), or Nearly every day (4)   Over the past 2 weeks, patient has been bothered:    (0) Not at all by hearing things other people could not hear    (0) Not at all by feeling that someone could hear their thoughts   Score: 0. Interpretation: 0: Negative screen. 1 or higher: Positive screen.   Substance abuse screening   DSM-5 Level 1 Cross-Cutting Symptom Measure, Section XIII. 2 items on use of tobacco, recreational drugs, or prescription medications beyond the amount prescribed or duration of prescription.   Over the past 2 weeks, patient:    (0) Did not use tobacco    (0) Did not use a recreational or prescription drug on their own   Score: 0. Interpretation: 0 is a negative screen. 1 or higher with positive response for prescription/recreational drug abuse leads to follow-up with Level 2 Cross-Cutting Symptom Measure, Section XIII. 1 or higher with positive response for tobacco use leads to tobacco cessation advice in AVS.   AUDIT-C. 3 items, shown if alcohol use reported above.   During the past year, patient:    (1) Had an alcoholic drink monthly or less    (0) Had 1 to 2 alcoholic drinks on a typical day when they were drinking    (1) Had 6 or more drinks on one occasion less than monthly   Score: 2. Interpretation: A score of 3 or higher in women is a positive screen for unhealthy drinking.   Comorbid/Exacerbating conditions   No history of asthma, cancer, chronic  pain, congestive heart failure, coronary artery disease, diabetes, epilepsy, hypertension, inflammatory arthritis, kidney disease or history of kindey function problems, lupus, multiple sclerosis, Parkinson disease, thyroid disorder, or viral hepatitis.   Past mental health history   Previous diagnosis of depression. Regarding month and year of first depression diagnosis, patient writes: 09/2006. Since initial depression diagnosis, patient has not had a period when symptoms resolved and they did not need medication.   Family history of mental health disorders   First-degree relative(s) with a history of depression and generalized anxiety disorder. Regarding medication taken by first-degree relative(s), patient writes: unanswered.   Current mental health treatment   Patient is not currently in counseling or therapy.   Currently taking venlafaxine.   As to effectiveness of current treatment:   Patient is satisfied with venlafaxine. Patient wants to refill venlafaxine.   Previous mental health treatment   Patient has been seen by a psychiatrist in the past 2 years.   Has taken escitalopram and bupropion in the past.   As to effectiveness of past treatment:   Patient was not satisfied with bupropion. They felt it caused bothersome side effects. No dry mouth, nausea, insomnia, or dizziness. Patient does not want to refill bupropion.   Patient was not satisfied with escitalopram. They felt it caused bothersome side effects. They had sexual side effects, weight gain, and drowsiness. Patient does not want to refill escitalopram.   Current medications   Currently taking tretinoin 0.1 % cream and venlafaxine  MG 24 hr capsule.   Medication allergies   None.   Medication contraindications   None.   Assessment   Major depressive disorder, recurrent, moderate.   This diagnosis is based on review of patient interview responses and other available clinical information.    PHQ-9 depression screening score: 13. Interpretation:  10 to 14: Major Depressive Disorder, Mild.    ELISE-7 generalized anxiety screening score: 2. Interpretation: 0 to 4: None to minimal.   Suicide risk severity screening was negative.   Screening results show low likelihood of liudmila/hypomania and psychosis.   Plan   Medications:    venlafaxine  mg capsule,extended release 24 hr RX 150mg 1 cap PO qd 30d for mood disorder. Amount is 30 cap. Refill amount is 2.   The patient's prescription will be sent to:   [facility]/pharmacy #[id number] [id number]   Phone: [phone]     Fax: [phone]   Education:    Condition and causes    Treatment and self-care    Possible medication side effects    When to call provider   ----------   Electronically signed by LYLE Barrientos on 2023-06-05 at 18:08PM   ----------   Patient Interview Transcript:   Have you ever been diagnosed with any of these mental health conditions? Select all that apply.    Depression   Not selected:    Generalized anxiety disorder (ELISE)    Panic attacks    Post traumatic stress disorder (PTSD)    Obsessive-compulsive disorder (OCD)    Bipolar disorder    Schizophrenia or schizoaffective disorder    A mental health condition not listed here (specify)    None of the above   When were you first diagnosed with depression? Please specify the month and year, or your best estimate, as MM/YYYY.    09/2006   Since you were first diagnosed with depression, has there been a time when your symptoms went away completely and you didn't need to take medication? Select one.    No   Not selected:    Yes   Are you currently taking medication for any mental health condition? Select one.    Yes   Not selected:    No   Which of these medications are you currently taking for a mental health condition? Select all that apply.    Effexor or Effexor XR (venlafaxine)   Not selected:    Atarax or Vistaril (hydroxyzine)    BuSpar (buspirone)    Celexa (citalopram)    Cymbalta or Drizalma Sprinkle  (duloxetine)    Lexapro (escitalopram)    Paxil, Paxil CR, or Pexeva (paroxetine)    Pristiq (desvenlafaxine)    Prozac (fluoxetine)    Remeron (mirtazapine)    Trazodone    Wellbutrin SR, Wellbutrin XL, Forfivo XL, or Aplenzin (bupropion)    Zoloft (sertraline)    Other (specify medication and whether you're satisfied with it)   Have you taken any other medications for this or any other mental health condition in the past? Select one.    Yes   Not selected:    No   Which medications have you taken in the past for your mental health condition(s)? Select all that apply.    Lexapro (escitalopram)    Wellbutrin SR, Wellbutrin XL, Forfivo XL, or Aplenzin (bupropion)   Not selected:    Atarax or Vistaril (hydroxyzine)    BuSpar (buspirone)    Celexa (citalopram)    Cymbalta or Drizalma Sprinkle (duloxetine)    Effexor or Effexor XR (venlafaxine)    Paxil, Paxil CR, or Pexeva (paroxetine)    Pristiq (desvenlafaxine)    Prozac (fluoxetine)    Remeron (mirtazapine)    Trazodone    Zoloft (sertraline)    Other (specify medication and whether you were satisfied with it)   I'm satisfied with Effexor or Effexor XR (venlafaxine)    Yes   Not selected:    No   I want to refill/restart    Yes   Not selected:    No   I'm satisfied with Lexapro (escitalopram)    No   Not selected:    Yes   I want to refill/restart    No   Not selected:    Yes   Why were you unsatisfied with Lexapro (escitalopram)? Select all that apply.    I don't like the side effects   Not selected:    It doesn't help my symptoms at all    It helps some, but I still have bothersome symptoms    It's too expensive    None of the above   Have you had any of these side effects when taking Lexapro (escitalopram)? Select all that apply.    Sexual side effects    Weight gain    Drowsiness   Not selected:    Trouble sleeping    Headache    Anxiety    Dizziness    None of the above   I'm satisfied with Wellbutrin SR, Wellbutrin XL, Forfivo XL, or Aplenzin    No   Not  selected:    Yes   I want to refill/restart    No   Not selected:    Yes   Why were you unsatisfied with Wellbutrin SR, Wellbutrin XL, Forfivo XL, or Aplenzin (bupropion)? Select all that apply.    I don't like the side effects   Not selected:    It doesn't help my symptoms at all    It helps some, but I still have bothersome symptoms    It's too expensive    None of the above   Have you had any of these side effects when taking Wellbutrin SR, Wellbutrin XL, Forfivo XL, or Aplenzin (bupropion)? Select all that apply.    None of the above   Not selected:    Dry mouth    Nausea    Trouble sleeping    Dizziness   Have you recently experienced unusual stress from any of these? Select all that apply.    None of the above   Not selected:    Personal relationships    Home situation    Family    Work    Finances    Something related to COVID-19    Current news and events   Are you currently in counseling or therapy? Select one.    No   Not selected:    Yes   Are you currently being seen by a psychiatrist, or have you been seen by a psychiatrist in the last 2 years? Select one.    Yes, within the last 2 years   Not selected:    Yes, currently    No   Do any of these apply to you? Select all that apply.    None of the above   Not selected:    I'm pregnant    I've given birth in the past 12 months    I'm breastfeeding   Do you have any of these medical conditions? Scroll to see all options. Select all that apply.    None of the above   Not selected:    Asthma    Cancer    Chronic pain    Congestive heart failure    Coronary artery disease (blocked arteries in the heart)    Diabetes    Epilepsy    High blood pressure    Inflammatory arthritis    Kidney disease or history of kidney function problems    Lupus (SLE)    Multiple sclerosis    Parkinson disease    Thyroid disorder    Viral hepatitis   Do any of these apply to your first-degree blood relatives? First-degree blood relatives include parents, siblings, and children who  you're related to by birth, not by marriage or adoption. Select all that apply.    Depression    Generalized anxiety disorder (ELISE)   Not selected:    Panic attacks    Post traumatic stress disorder (PTSD)    Obsessive-compulsive disorder (OCD)    Bipolar disorder    Schizophrenia or schizoaffective disorder    Drug or alcohol addiction (substance use disorder)     by suicide    Attempted suicide    No, not that I know of   Are any of your first-degree blood relatives taking medications for their condition? Select one.    Yes   Not selected:    No    I'm not sure   Genetics often play a role in how well medications work for mental health conditions. For example, if your sister with depression did well on sertraline (Zoloft), then it's likely that sertraline would work well for you, too. If you know the name of the medication your family member takes for their mental health condition, list it here. If not, click Next.   The patient did not enter any additional information.   1. Over the past 2 weeks, how often have you been bothered by: Having little interest or pleasure in doing things Select one.    Several days   Not selected:    Not at all    More than half the days    Nearly every day   2. Over the past 2 weeks, how often have you been bothered by: Feeling down, depressed, or hopeless Select one.    More than half the days   Not selected:    Not at all    Several days    Nearly every day   3. Over the past 2 weeks, how often have you been bothered by: Trouble falling or staying asleep, or sleeping too much Select one.    More than half the days   Not selected:    Not at all    Several days    Nearly every day   4. Over the past 2 weeks, how often have you been bothered by: Feeling tired or having little energy Select one.    Several days   Not selected:    Not at all    More than half the days    Nearly every day   5. Over the past 2 weeks, how often have you been bothered by: Poor appetite or overeating  Select one.    More than half the days   Not selected:    Not at all    Several days    Nearly every day   6. Over the past 2 weeks, how often have you been bothered by: Feeling bad about yourself, that you're a failure, or that you've let yourself or friends and family down Select one.    More than half the days   Not selected:    Not at all    Several days    Nearly every day   7. Over the past 2 weeks, how often have you been bothered by: Trouble concentrating on things like watching TV or reading the news Select one.    Several days   Not selected:    Not at all    More than half the days    Nearly every day   8. Over the past 2 weeks, how often have you been bothered by: Moving or speaking so slowly that other people could have noticed OR Being so fidgety or restless that you have been moving around a lot more than usual Select one.    More than half the days   Not selected:    Not at all    Several days    Nearly every day   9. Over the past 2 weeks, how often have you been bothered by: Thoughts that you'd be better off dead or thoughts of hurting yourself Select one.    Not at all   Not selected:    Several days    More than half the days    Nearly every day   How difficult have these problems made it for you to work, take care of things at home, or get along with other people? Select one.    Somewhat difficult   Not selected:    Not difficult at all    Very difficult    Extremely difficult   1. Over the past 2 weeks, how often have you been bothered by: Feeling nervous, anxious, or on edge? Select one.    Several days   Not selected:    Not at all    More than half the days    Nearly every day   2. Over the past 2 weeks, how often have you been bothered by: Not being able to stop or control worrying? Select one.    Not at all   Not selected:    Several days    More than half the days    Nearly every day   3. Over the past 2 weeks, how often have you been bothered by: Worrying too much about different things?  Select one.    Not at all   Not selected:    Several days    More than half the days    Nearly every day   4. Over the past 2 weeks, how often have you been bothered by: Having trouble relaxing? Select one.    Not at all   Not selected:    Several days    More than half the days    Nearly every day   5. Over the past 2 weeks, how often have you been bothered by: Being so restless that it's hard to sit still? Select one.    Not at all   Not selected:    Several days    More than half the days    Nearly every day   6. Over the past 2 weeks, how often have you been bothered by: Becoming easily annoyed or irritable? Select one.    Several days   Not selected:    Not at all    More than half the days    Nearly every day   7. Over the past 2 weeks, how often have you been bothered by: Feeling afraid, as if something awful might happen? Select one.    Not at all   Not selected:    Several days    More than half the days    Nearly every day   How difficult have these symptoms made it for you to do your work, take care of things at home, or get along with other people? Select one.    Not difficult at all   Not selected:    Somewhat difficult    Very difficult    Extremely difficult   Over the past 2 weeks, how often have you been bothered by: Sleeping less than usual, but still having a lot of energy? Select one.    Not at all   Not selected:    1 to 2 days    Several days    More than half the days    Nearly every day   Over the past 2 weeks, how often have you been bothered by: Starting lots more projects than usual or doing more risky things than usual? Select one.    Not at all   Not selected:    1 to 2 days    Several days    More than half the days    Nearly every day   Over the past 2 weeks, how often have you been bothered by: Hearing things other people couldn't hear, such as voices even when no one was around? Select one.    Not at all   Not selected:    1 to 2 days    Several days    More than half the days    " Nearly every day   Over the past 2 weeks, how often have you been bothered by: Feeling that someone could hear your thoughts, or that you could hear what another person was thinking? Select one.    Not at all   Not selected:    1 to 2 days    Several days    More than half the days    Nearly every day   Over the past 2 weeks, how often have you been bothered by: Unpleasant thoughts, urges, or images that repeatedly enter your mind? Select one.    Not at all   Not selected:    1 to 2 days    Several days    More than half the days    Nearly every day   Over the past 2 weeks, how often have you been bothered by: Feeling driven to perform certain behaviors or mental acts over and over again? Select one.    Not at all   Not selected:    1 to 2 days    Several days    More than half the days    Nearly every day   In the past year, how often did you have a drink containing alcohol? Select one.    Monthly or less   Not selected:    Never    2 to 4 times per month    2 to 3 times per week    4 or more times per week   In the past year, how many drinks did you have on a typical day when you were drinking? Select one.    1 or 2   Not selected:    3 or 4    5 or 6    7 to 9    10 or more   In the past year, how often did you have 6 or more drinks on one occasion? Select one.    Less than monthly   Not selected:    Never    Monthly    Weekly    Daily or almost daily   Over the past 2 weeks, how often have you: Smoked any cigarettes, smoked a cigar or pipe, or used snuff or chewing tobacco? Select one.    Not at all   Not selected:    1 to 2 days    Several days    More than half the days    Nearly every day   Over the past 2 weeks, how often did you use any of these medicines on your own? \"On your own\" means without a doctor's prescription, or more than prescribed, or longer than prescribed. - Prescription painkillers, such as Vicodin - Stimulants, such as Ritalin or Adderall - Sedatives or tranquilizers, such as sleeping " "pills or Valium - Marijuana - Cocaine or crack - Club drugs, such as Ecstasy - Hallucinogens, such as LSD - Heroin - Inhalants or solvents, such as glue - Methamphetamines, such as speed Select one.    Not at all   Not selected:    1 to 2 days    Several days    More than half the days    Nearly every day   Think about all of the symptoms you've shared with us today. How long have you been feeling this way? Select one.    More than a year   Not selected:    Less than a year    I'm not sure   These last few questions help us make sure your treatment plan is safe for you. Do you have any of these conditions? Select all that apply.    None of these   Not selected:    Uncorrected or persistent electrolyte abnormalities, such as potassium, sodium, calcium or magnesium    QT prolongation    Congenital long QT syndrome (LQTS)    Ventricular arrhythmias, such as ventricular fibrillation or ventricular tachycardia    Bradycardia (low heart rate)    Recent heart attack    Congestive heart failure (CHF)   Do any of these apply to you now or in the recent past? \"Cold turkey\" here means stopping a medication suddenly rather than slowly taking lower and lower doses until you're off the medication. Select all that apply.    None of these   Not selected:    Seizure disorder    Bulimia or anorexia    Liver disease    Alcohol abuse    Stopped using alcohol \"cold turkey\"    Stopped using a sedative \"cold turkey\"    Stopped using an anti-seizure drug \"cold turkey\"    Stopped using a benzodiazepine drug (Klonopin, Valium, Ativan, Xanax) \"cold turkey\"   Do any of the following apply to you? Select all that apply.    None of these   Not selected:    I'm currently taking pimozide    I'm currently taking thioridazine    I've taken an MAO inhibitor in the last 14 days    I've taken linezolid or IV methylene blue in the last 14 days   Are you still taking these medications listed in your medical record? If you're not taking any of these, " click Next. Select all that apply.    tretinoin 0.1 % cream    venlafaxine  MG 24 hr capsule   Are you taking any other medications, vitamins, or supplements? Select one.    No   Not selected:    Yes   Have you ever had an allergic or bad reaction to any medication? Select one.    No   Not selected:    Yes   Do you need a doctor's note? A doctor's note confirms that you received care today and states when you can return to school or work. It does not contain information about your diagnosis or treatment plan. Your provider will make the final decision on whether to give you a doctor's note. Doctor's notes CANNOT be backdated. Select one.    No   Not selected:    Today only (1 day)    Today and tomorrow (2 days)    3 days   What is the main reason you're taking this interview today?    I need my medicine refilled   ----------   Medical history   Medical history data does not currently exist for this patient.

## 2023-06-05 NOTE — EXTERNAL PATIENT INSTRUCTIONS
Diagnosis   Depression   My name is Kavitha DardenLYLE devine. I'm a healthcare provider at [facility]. I've reviewed your interview, and I see that you have some common symptoms of depression. I'm glad you reached out.   Depression is the most common mental health condition worldwide. In the [place], about 1 in 5 people will experience clinical depression in their lifetime. Fortunately, effective treatments are available. The sooner you start treatment, the better it works.   Treatment for depression can include counseling, coaching, consultations, antidepressant medications, or various digital tools. In creating your treatment plan, I've considered your symptoms, current situation, medical history, and previous treatments, if any.    Please follow up with your provider in a few weeks. They'll check how you're doing and adjust your treatment plan if necessary.   In addition to your prescribed treatment, there are things you can do to help yourself feel better. Depression can lead you to avoid doing tasks, activities, and being with others. Taking action can help break the cycle of avoidance. Even small actions and lifestyle changes can make a big difference. Try some of the suggestions in the Other treatment section below.   Medications   Your pharmacy   [id number] [address] [place], [place] [id number] [phone]     Prescription   Venlafaxine extended release (150mg): Take 1 capsule by mouth once a day for mood disorder. You have 2 refills for this prescription. Common brand names include Effexor XR.    The antidepressant medication prescribed today may be different from what you're already taking. If so, speak to the pharmacist about safely switching treatments. Suddenly stopping any antidepressant medication can lead to uncomfortable withdrawal symptoms.   About your diagnosis   Depression is different from ordinary sadness. When you're sad or going through normal grief, the feelings  may come and go, and then fade over time. Depression causes long-standing symptoms that affect your ability to go about your daily life.   It's a health condition that affects how you think and function, and can even affect your self-esteem. Sometimes depression can also cause physical symptoms such as headaches and stomach pains.   Common symptoms of depression include:    Feeling sad, hopeless, discouraged, or down    Loss of interest or pleasure in previously enjoyable activities    Appetite or weight changes    Sleep disturbances: sleeping too much or too little    Either restlessness or sluggishness    Loss of energy    Excessive guilt    Feelings of worthlessness    Difficulty concentrating    Recurrent thoughts of death or suicide   What to expect   Antidepressant medications take time to work. Many people start to feel better within 2 weeks. But others may not notice improvement until after 4 weeks of treatment. After that, it can take 6 to 12 weeks before the medication has its full effect.   Common side effects of antidepressant medications include:    Dry mouth    Dizziness    Drowsiness    Jitteriness    Nausea, vomiting, or diarrhea    Sexual problems    Weight gain   Many of these side effects will fade after a few days or weeks of use. If any of the side effects are very bothersome or uncomfortable, please let us know. We may be able to change the dose or switch to a different medication. Finding the right medication and the right dose often takes some trial and error.   However, medication should not make your symptoms worse. Contact us immediately if your mood symptoms get worse or if you notice any of the symptoms in the When to seek care section below.   Never stop an antidepressant medicine without first talking to a healthcare provider. Suddenly stopping this type of medication can cause withdrawal symptoms.   When to seek care   If you feel like harming yourself or others, call 911 right  away.   The [facility] Suicide and Crisis Lifeline is also available. You can call [phone]   to speak with a counselor at the lifeline, or you can connect with one using their online chat  .   Call us at [phone]   with any sudden or unexpected symptoms.    Worsening depression symptoms    New or worsening anxiety symptoms    Feeling extremely agitated or restless    Panic attacks    Worsening insomnia    New or worsening irritability    Inappropriate aggression, anger, or violence    Dangerous impulses    Extreme increase in activity or talking    Other unusual changes in behavior, mood, thoughts, or feeling    Uncomfortable side effects of new medications    No improvement at all after 2 to 4 weeks on the new medication   Other treatment   The tips below may help you feel better while you start your treatment plan:   Self-care    Depression can make self-care hard, but taking action can help you get better. So start where you are and set small goals. These can be simple: get out of bed, take a shower, get dressed, prepare a meal.    Make a list of activities that usually improve your mood. When you're feeling down, try doing one of those activities, even for a few minutes.    Be kind to yourself. Don't get down on yourself if you don't reach a goal. Be willing to try again.    Try to eat on a regular schedule. Blood sugar levels can affect mood.   Exercise    Physical exercise has an especially positive effect on mood. If you're able to, try walking 30 minutes a day, 3 to 5 times a week. If that sounds like too much, challenge yourself to start walking for just 10 minutes a day. If walking is not for you, find another activity. Any kind of physical activity helps. The best exercise is the kind you enjoy and will actually do.   Improve your sleep   Getting better sleep is one of the best things you can do to improve your symptoms.    Caffeine, tobacco, and alcohol can cause interrupted sleep. Cutting down or quitting  "these can improve the quality of your sleep. If you can't quit caffeine completely, try avoiding it later in the day.    Set a regular bedtime, and allow a period of time to \"unwind\" before going to sleep.    Wake up at the same time every day.    Turn off or put away all electronic devices an hour before going to sleep.    Avoid reading, watching TV, or using electronic devices in bed.    As much as possible, keep your bedroom dark, cool, and quiet.    If you're struggling to sleep, don't stay in bed. Get up and go to a quiet spot. Read or do relaxation exercises. Then go back to bed and try again.   Try mindfulness exercises    If your mind races, focus on your body instead. Breathe in slowly through your nose and out through your mouth.    Some people find that meditation helps with mood symptoms. If you want to try meditation but don't know how, mobile apps can get you started.   Use your creativity    When you have depression, you can often spend too much time thinking. Making something with your hands can use your thoughts in a positive way and bring some relief. It also helps you move from inaction to action. Activities like writing in a journal, gardening, woodworking, cooking, or doing a craft can help focus your mind.   Connect with others    If you can't meet in person, send a short text or email to someone just to keep in touch.    If you use social media, notice how it makes you feel. If certain topics or people have a negative effect on your mood, unfollow them. Limit the time you spend on social media. Active participation can be better than passive scrolling through a feed.    If you're up to it, try volunteering. Or just do something kind for someone. This can lift your mood as well as theirs.   Your provider   Your diagnosis was provided by LYLE Barrientos, a member of your trusted care team at [facility].   If you have any questions, call us at [phone]  .    "

## 2023-06-09 DIAGNOSIS — F33.1 MODERATE EPISODE OF RECURRENT MAJOR DEPRESSIVE DISORDER: ICD-10-CM

## 2023-06-09 RX ORDER — VENLAFAXINE HYDROCHLORIDE 150 MG/1
150 CAPSULE, EXTENDED RELEASE ORAL DAILY
Qty: 90 CAPSULE | Refills: 0 | Status: SHIPPED | OUTPATIENT
Start: 2023-06-09

## 2023-09-07 DIAGNOSIS — F33.1 MODERATE EPISODE OF RECURRENT MAJOR DEPRESSIVE DISORDER: ICD-10-CM

## 2023-09-07 DIAGNOSIS — L70.0 CYSTIC ACNE: ICD-10-CM

## 2023-09-07 RX ORDER — VENLAFAXINE HYDROCHLORIDE 150 MG/1
CAPSULE, EXTENDED RELEASE ORAL
Qty: 90 CAPSULE | OUTPATIENT
Start: 2023-09-07

## 2023-09-07 RX ORDER — VENLAFAXINE HYDROCHLORIDE 150 MG/1
150 CAPSULE, EXTENDED RELEASE ORAL DAILY
Qty: 14 CAPSULE | Refills: 0 | Status: SHIPPED | OUTPATIENT
Start: 2023-09-07

## 2023-09-07 RX ORDER — VENLAFAXINE HYDROCHLORIDE 150 MG/1
CAPSULE, EXTENDED RELEASE ORAL
Qty: 90 CAPSULE | Refills: 0 | OUTPATIENT
Start: 2023-09-07

## 2023-09-07 RX ORDER — TRETINOIN 1 MG/G
CREAM TOPICAL NIGHTLY
Qty: 20 G | Refills: 5 | OUTPATIENT
Start: 2023-09-07

## 2023-09-19 ENCOUNTER — OFFICE VISIT (OUTPATIENT)
Dept: FAMILY MEDICINE CLINIC | Facility: CLINIC | Age: 43
End: 2023-09-19
Payer: COMMERCIAL

## 2023-09-19 VITALS
RESPIRATION RATE: 18 BRPM | HEART RATE: 86 BPM | TEMPERATURE: 98 F | DIASTOLIC BLOOD PRESSURE: 66 MMHG | SYSTOLIC BLOOD PRESSURE: 116 MMHG | WEIGHT: 183.6 LBS | HEIGHT: 67 IN | OXYGEN SATURATION: 99 % | BODY MASS INDEX: 28.82 KG/M2

## 2023-09-19 DIAGNOSIS — Z13.6 ENCOUNTER FOR LIPID SCREENING FOR CARDIOVASCULAR DISEASE: ICD-10-CM

## 2023-09-19 DIAGNOSIS — Z98.82 H/O BILATERAL BREAST IMPLANTS: ICD-10-CM

## 2023-09-19 DIAGNOSIS — E55.9 VITAMIN D INSUFFICIENCY: ICD-10-CM

## 2023-09-19 DIAGNOSIS — Z00.00 ENCOUNTER FOR WELL ADULT EXAM WITHOUT ABNORMAL FINDINGS: Primary | ICD-10-CM

## 2023-09-19 DIAGNOSIS — Z01.419 ENCOUNTER FOR GYNECOLOGICAL EXAMINATION WITH PAPANICOLAOU SMEAR OF CERVIX: ICD-10-CM

## 2023-09-19 DIAGNOSIS — Z12.31 ENCOUNTER FOR SCREENING MAMMOGRAM FOR MALIGNANT NEOPLASM OF BREAST: ICD-10-CM

## 2023-09-19 DIAGNOSIS — L70.0 CYSTIC ACNE: ICD-10-CM

## 2023-09-19 DIAGNOSIS — F33.1 MODERATE EPISODE OF RECURRENT MAJOR DEPRESSIVE DISORDER: ICD-10-CM

## 2023-09-19 DIAGNOSIS — Z13.220 ENCOUNTER FOR LIPID SCREENING FOR CARDIOVASCULAR DISEASE: ICD-10-CM

## 2023-09-19 RX ORDER — VENLAFAXINE HYDROCHLORIDE 150 MG/1
150 CAPSULE, EXTENDED RELEASE ORAL DAILY
Qty: 90 CAPSULE | Refills: 3 | Status: SHIPPED | OUTPATIENT
Start: 2023-09-19

## 2023-09-19 RX ORDER — TRETINOIN 1 MG/G
CREAM TOPICAL NIGHTLY
Qty: 45 G | Refills: 5 | Status: SHIPPED | OUTPATIENT
Start: 2023-09-19

## 2023-09-19 NOTE — PROGRESS NOTES
"Chief Complaint   Patient presents with    Annual Exam     Physical with pap       Subjective   The patient is a 43 y.o. female who presents for annual exam with pap      Past Gynecological History:     No LMP recorded. Patient has had an ablation.     Menses: regular every 28-30 days, light after ablation        Pap History:was done on approximately 2019 and the result was: normal PAP.     Date of last mammogram: was done on approximately 2021 and the result was: Birads I (Normal). Bilateral saline implants placed 23 years ago     Past Medical History,Medications, Allergies reviewed.     HPI  F/u for depression. Doing well on effexor. Has been on for years   Needs refills   Requesting RF on tretinoin for her cystic acne. Skin has been doing well   Agreeable to labs today     Pt states she is going to a weight loss clinic and started \"Ozempic\"  a couple of weeks ago   Body mass index is 28.76 kg/m².      Objective     /66   Pulse 86   Temp 98 °F (36.7 °C)   Resp 18   Ht 170.2 cm (67\")   Wt 83.3 kg (183 lb 9.6 oz)   SpO2 99%   BMI 28.76 kg/m²     Physical Exam  Vitals and nursing note reviewed.   Constitutional:       Appearance: Normal appearance. She is well-developed.   HENT:      Head: Normocephalic and atraumatic.      Right Ear: Tympanic membrane, ear canal and external ear normal.      Left Ear: Tympanic membrane, ear canal and external ear normal.      Nose: Nose normal.      Mouth/Throat:      Pharynx: No oropharyngeal exudate or posterior oropharyngeal erythema.   Eyes:      Conjunctiva/sclera: Conjunctivae normal.   Neck:      Thyroid: No thyromegaly.      Trachea: No tracheal deviation.   Cardiovascular:      Rate and Rhythm: Normal rate and regular rhythm.      Heart sounds: Normal heart sounds. No murmur heard.    No friction rub. No gallop.   Pulmonary:      Effort: Pulmonary effort is normal. No respiratory distress.      Breath sounds: Normal breath sounds. No wheezing or rales.   Chest: "      Chest wall: No tenderness.   Breasts:     Right: Normal.      Left: Normal.      Comments: Bilateral breast implants  Abdominal:      General: Bowel sounds are normal. There is no distension.      Palpations: Abdomen is soft. There is no mass.      Tenderness: There is no abdominal tenderness. There is no guarding or rebound.      Hernia: No hernia is present.   Genitourinary:     Exam position: Supine.      Vagina: Normal.      Cervix: Normal.      Uterus: Normal.       Adnexa: Right adnexa normal and left adnexa normal.      Comments: Pap smear obtained   Musculoskeletal:      Cervical back: Normal range of motion and neck supple.   Lymphadenopathy:      Cervical: No cervical adenopathy.   Skin:     General: Skin is warm and dry.   Neurological:      Mental Status: She is alert and oriented to person, place, and time.   Psychiatric:         Mood and Affect: Mood normal.         Behavior: Behavior normal.         Thought Content: Thought content normal.         Judgment: Judgment normal.       Assessment & Plan     1. Encounter for well adult exam without abnormal findings  - CBC w AUTO Differential  - Comprehensive metabolic panel  - Lipid Panel  - TSH  - T4, free  - Iron Profile  - Vitamin B12  - Folate  - Vitamin D 25 hydroxy  - LIQUID-BASED PAP SMEAR, [facility] (ERMA,COR,[facility]AD)    2. Moderate episode of recurrent major depressive disorder  - venlafaxine XR (EFFEXOR-XR) 150 MG 24 hr capsule; Take 1 capsule by mouth Daily. Patient must keep upcoming appointment  Dispense: 90 capsule; Refill: 3  - CBC w AUTO Differential  - Comprehensive metabolic panel  - TSH  - T4, free  - Iron Profile  - Vitamin B12  - Folate    3. Cystic acne  - tretinoin (RETIN-A) 0.1 % cream; Apply  topically to the appropriate area as directed Every Night.  Dispense: 45 g; Refill: 5    4. Encounter for screening mammogram for malignant neoplasm of breast  - Mammo screening digital tomosynthesis bilateral w CAD    5. Vitamin D  insufficiency  - Vitamin D 25 hydroxy    6. Encounter for lipid screening for cardiovascular disease  - Lipid Panel    7. Encounter for gynecological examination with Papanicolaou smear of cervix  - LIQUID-BASED PAP SMEAR, [facility] (ERMA,COR,[facility])    8. H/O bilateral breast implants    Screening labs as outlined in plan   Pap collected today   Mammogram ordered   Refills as noted. Continue current treatment   F/u in one year for annual exam or sooner if needed     Counseling was given to patient for the following topics: instructions for management, risk factor reductions, patient and family education, and impressions . Total time of the encounter was 20 minutes and 10 minutes was spent counseling.      YAEL Torres

## 2023-09-20 LAB
25(OH)D3+25(OH)D2 SERPL-MCNC: 32.9 NG/ML (ref 30–100)
ALBUMIN SERPL-MCNC: 4.7 G/DL (ref 3.9–4.9)
ALBUMIN/GLOB SERPL: 2 {RATIO} (ref 1.2–2.2)
ALP SERPL-CCNC: 50 IU/L (ref 44–121)
ALT SERPL-CCNC: 12 IU/L (ref 0–32)
AST SERPL-CCNC: 19 IU/L (ref 0–40)
BASOPHILS # BLD AUTO: 0 X10E3/UL (ref 0–0.2)
BASOPHILS NFR BLD AUTO: 1 %
BILIRUB SERPL-MCNC: <0.2 MG/DL (ref 0–1.2)
BUN SERPL-MCNC: 11 MG/DL (ref 6–24)
BUN/CREAT SERPL: 14 (ref 9–23)
CALCIUM SERPL-MCNC: 9.6 MG/DL (ref 8.7–10.2)
CHLORIDE SERPL-SCNC: 100 MMOL/L (ref 96–106)
CHOLEST SERPL-MCNC: 180 MG/DL (ref 100–199)
CO2 SERPL-SCNC: 24 MMOL/L (ref 20–29)
CREAT SERPL-MCNC: 0.77 MG/DL (ref 0.57–1)
EGFRCR SERPLBLD CKD-EPI 2021: 97 ML/MIN/1.73
EOSINOPHIL # BLD AUTO: 0.2 X10E3/UL (ref 0–0.4)
EOSINOPHIL NFR BLD AUTO: 2 %
ERYTHROCYTE [DISTWIDTH] IN BLOOD BY AUTOMATED COUNT: 11.7 % (ref 11.7–15.4)
FOLATE SERPL-MCNC: 9.5 NG/ML
GLOBULIN SER CALC-MCNC: 2.3 G/DL (ref 1.5–4.5)
GLUCOSE SERPL-MCNC: 78 MG/DL (ref 70–99)
HCT VFR BLD AUTO: 41 % (ref 34–46.6)
HDLC SERPL-MCNC: 74 MG/DL
HGB BLD-MCNC: 13.5 G/DL (ref 11.1–15.9)
IMM GRANULOCYTES # BLD AUTO: 0 X10E3/UL (ref 0–0.1)
IMM GRANULOCYTES NFR BLD AUTO: 0 %
IRON SATN MFR SERPL: 17 % (ref 15–55)
IRON SERPL-MCNC: 58 UG/DL (ref 27–159)
LDLC SERPL CALC-MCNC: 83 MG/DL (ref 0–99)
LYMPHOCYTES # BLD AUTO: 2.5 X10E3/UL (ref 0.7–3.1)
LYMPHOCYTES NFR BLD AUTO: 29 %
MCH RBC QN AUTO: 30.8 PG (ref 26.6–33)
MCHC RBC AUTO-ENTMCNC: 32.9 G/DL (ref 31.5–35.7)
MCV RBC AUTO: 94 FL (ref 79–97)
MONOCYTES # BLD AUTO: 0.7 X10E3/UL (ref 0.1–0.9)
MONOCYTES NFR BLD AUTO: 8 %
NEUTROPHILS # BLD AUTO: 5.3 X10E3/UL (ref 1.4–7)
NEUTROPHILS NFR BLD AUTO: 60 %
PLATELET # BLD AUTO: 404 X10E3/UL (ref 150–450)
POTASSIUM SERPL-SCNC: 4.2 MMOL/L (ref 3.5–5.2)
PROT SERPL-MCNC: 7 G/DL (ref 6–8.5)
RBC # BLD AUTO: 4.38 X10E6/UL (ref 3.77–5.28)
SODIUM SERPL-SCNC: 138 MMOL/L (ref 134–144)
T4 FREE SERPL-MCNC: 1.05 NG/DL (ref 0.82–1.77)
TIBC SERPL-MCNC: 341 UG/DL (ref 250–450)
TRIGL SERPL-MCNC: 135 MG/DL (ref 0–149)
TSH SERPL DL<=0.005 MIU/L-ACNC: 1.7 UIU/ML (ref 0.45–4.5)
UIBC SERPL-MCNC: 283 UG/DL (ref 131–425)
VIT B12 SERPL-MCNC: 331 PG/ML (ref 232–1245)
VLDLC SERPL CALC-MCNC: 23 MG/DL (ref 5–40)
WBC # BLD AUTO: 8.6 X10E3/UL (ref 3.4–10.8)

## 2023-09-22 LAB — REF LAB TEST METHOD: NORMAL

## 2023-11-15 ENCOUNTER — HOSPITAL ENCOUNTER (OUTPATIENT)
Dept: MAMMOGRAPHY | Facility: HOSPITAL | Age: 43
Discharge: HOME | End: 2023-11-15
Admitting: PHYSICIAN ASSISTANT
Payer: COMMERCIAL

## 2023-11-15 PROCEDURE — 77067 SCR MAMMO BI INCL CAD: CPT

## 2023-11-15 PROCEDURE — 77063 BREAST TOMOSYNTHESIS BI: CPT

## 2024-09-05 DIAGNOSIS — F33.1 MODERATE EPISODE OF RECURRENT MAJOR DEPRESSIVE DISORDER: ICD-10-CM

## 2024-09-05 RX ORDER — VENLAFAXINE HYDROCHLORIDE 150 MG/1
150 CAPSULE, EXTENDED RELEASE ORAL DAILY
Qty: 90 CAPSULE | Refills: 3 | Status: SHIPPED | OUTPATIENT
Start: 2024-09-05

## 2024-09-23 ENCOUNTER — OFFICE VISIT (OUTPATIENT)
Dept: FAMILY MEDICINE CLINIC | Facility: CLINIC | Age: 44
End: 2024-09-23
Payer: COMMERCIAL

## 2024-09-23 VITALS
OXYGEN SATURATION: 99 % | TEMPERATURE: 98 F | HEIGHT: 67 IN | SYSTOLIC BLOOD PRESSURE: 122 MMHG | HEART RATE: 82 BPM | WEIGHT: 191 LBS | BODY MASS INDEX: 29.98 KG/M2 | DIASTOLIC BLOOD PRESSURE: 68 MMHG

## 2024-09-23 DIAGNOSIS — E55.9 VITAMIN D DEFICIENCY: ICD-10-CM

## 2024-09-23 DIAGNOSIS — L70.0 CYSTIC ACNE: ICD-10-CM

## 2024-09-23 DIAGNOSIS — Z13.6 ENCOUNTER FOR LIPID SCREENING FOR CARDIOVASCULAR DISEASE: ICD-10-CM

## 2024-09-23 DIAGNOSIS — Z13.220 ENCOUNTER FOR LIPID SCREENING FOR CARDIOVASCULAR DISEASE: ICD-10-CM

## 2024-09-23 DIAGNOSIS — Z12.11 SCREENING FOR MALIGNANT NEOPLASM OF COLON: ICD-10-CM

## 2024-09-23 DIAGNOSIS — Z00.00 ENCOUNTER FOR WELL ADULT EXAM WITHOUT ABNORMAL FINDINGS: Primary | ICD-10-CM

## 2024-09-23 DIAGNOSIS — F33.1 MODERATE EPISODE OF RECURRENT MAJOR DEPRESSIVE DISORDER: ICD-10-CM

## 2024-09-23 PROCEDURE — 99396 PREV VISIT EST AGE 40-64: CPT | Performed by: PHYSICIAN ASSISTANT

## 2024-09-23 RX ORDER — VENLAFAXINE HYDROCHLORIDE 150 MG/1
150 CAPSULE, EXTENDED RELEASE ORAL DAILY
Qty: 90 CAPSULE | Refills: 3 | Status: SHIPPED | OUTPATIENT
Start: 2024-09-23

## 2024-09-23 RX ORDER — TRETINOIN 1 MG/G
CREAM TOPICAL NIGHTLY
Qty: 45 G | Refills: 5 | Status: SHIPPED | OUTPATIENT
Start: 2024-09-23

## 2024-09-27 ENCOUNTER — TELEPHONE (OUTPATIENT)
Dept: FAMILY MEDICINE CLINIC | Facility: CLINIC | Age: 44
End: 2024-09-27
Payer: COMMERCIAL

## 2024-09-27 DIAGNOSIS — L70.0 CYSTIC ACNE: ICD-10-CM

## 2024-09-27 NOTE — TELEPHONE ENCOUNTER
PA on Tretinoin cream Key: [id number]  Denied   Your plan only covers this drug when it is used for certain health conditions. Covered uses  are for acne vulgaris or keratosis follicularis (Darier’s disease, Darier-White disease).  LVM to call back with any questions

## 2025-01-08 ENCOUNTER — OFFICE VISIT (OUTPATIENT)
Dept: FAMILY MEDICINE CLINIC | Facility: CLINIC | Age: 45
End: 2025-01-08
Payer: COMMERCIAL

## 2025-01-08 VITALS
WEIGHT: 195.8 LBS | SYSTOLIC BLOOD PRESSURE: 130 MMHG | RESPIRATION RATE: 14 BRPM | BODY MASS INDEX: 30.73 KG/M2 | TEMPERATURE: 98.2 F | OXYGEN SATURATION: 99 % | DIASTOLIC BLOOD PRESSURE: 80 MMHG | HEART RATE: 84 BPM | HEIGHT: 67 IN

## 2025-01-08 DIAGNOSIS — R10.31 RLQ ABDOMINAL PAIN: Primary | ICD-10-CM

## 2025-01-08 DIAGNOSIS — R11.2 NAUSEA AND VOMITING, UNSPECIFIED VOMITING TYPE: ICD-10-CM

## 2025-01-08 LAB
EXPIRATION DATE: NORMAL
FLUAV AG UPPER RESP QL IA.RAPID: NOT DETECTED
FLUBV AG UPPER RESP QL IA.RAPID: NOT DETECTED
INTERNAL CONTROL: NORMAL
Lab: NORMAL
SARS-COV-2 AG UPPER RESP QL IA.RAPID: NOT DETECTED

## 2025-01-08 PROCEDURE — 87428 SARSCOV & INF VIR A&B AG IA: CPT | Performed by: NURSE PRACTITIONER

## 2025-01-08 PROCEDURE — 99213 OFFICE O/P EST LOW 20 MIN: CPT | Performed by: NURSE PRACTITIONER

## 2025-01-08 RX ORDER — ONDANSETRON 8 MG/1
8 TABLET, ORALLY DISINTEGRATING ORAL EVERY 8 HOURS PRN
Status: CANCELLED | OUTPATIENT
Start: 2025-01-08

## 2025-01-08 NOTE — PROGRESS NOTES
"  Date: 2025   Patient Name: Hilaria Hanna  : 1980   MRN: [id number]     Chief Complaint:    Chief Complaint   Patient presents with    nausea and vomitting     Since last night       History of Present Illness: Hilaria Hanna is a 44 y.o. female who is here today to follow up for Nausea, vomiting, and RLQ abdominal pain that started last night.   Not taking any medication   Last vomiting episode about 1 pm  No diarrhea   No known exposure to illnesses   No fevers, headaches, body aches            Review of Systems:   Review of Systems   Gastrointestinal:  Positive for abdominal pain, nausea and vomiting.       I have reviewed the patients family history, social history, past medical history, past surgical history and have updated it as appropriate.     Medications:     Current Outpatient Medications:     levocetirizine (XYZAL) 5 MG tablet, Take 1 tablet by mouth Every Evening., Disp: , Rfl:     Multiple Vitamins-Minerals (MULTIVITAMIN ADULT PO), Take 1 tablet by mouth Daily., Disp: , Rfl:     tretinoin (RETIN-A) 0.1 % cream, Apply  topically to the appropriate area as directed Every Night., Disp: 45 g, Rfl: 5    venlafaxine XR (EFFEXOR-XR) 150 MG 24 hr capsule, Take 1 capsule by mouth Daily. Patient must keep upcoming appointment, Disp: 90 capsule, Rfl: 3    Allergies:   No Known Allergies    PHQ-9 Total Score:      Physical Exam:  Vital Signs:   Vitals:    25 1540   BP: 130/80   Pulse: 84   Resp: 14   Temp: 98.2 °F (36.8 °C)   SpO2: 99%   Weight: 88.8 kg (195 lb 12.8 oz)   Height: 170.2 cm (67\")     Body mass index is 30.67 kg/m².           Physical Exam  Vitals and nursing note reviewed.   Constitutional:       Appearance: Normal appearance.   HENT:      Head: Normocephalic and atraumatic.   Cardiovascular:      Rate and Rhythm: Normal rate and regular rhythm.   Pulmonary:      Effort: Pulmonary effort is normal.      Breath sounds: Normal breath sounds.   Abdominal:      Tenderness: " There is abdominal tenderness in the right lower quadrant. There is guarding and rebound.   Skin:     General: Skin is warm.   Neurological:      General: No focal deficit present.      Mental Status: She is alert and oriented to person, place, and time.   Psychiatric:         Mood and Affect: Mood normal.           Assessment/Plan:   Diagnoses and all orders for this visit:    1. RLQ abdominal pain (Primary)  Comments:  guarding, sent to ER for further care, r/o appendicitis    2. Nausea and vomiting, unspecified vomiting type  Comments:  negative covid and flu  Orders:  -     POCT SARS-CoV-2 Antigen ADRIÁN + Flu       *20 minutes spent with patient POC, education and medication management.     Follow Up:   Return if symptoms worsen or fail to improve.      Shena Roberts. LYLE   [facility]

## 2025-01-28 ENCOUNTER — TELEPHONE (OUTPATIENT)
Dept: FAMILY MEDICINE CLINIC | Facility: CLINIC | Age: 45
End: 2025-01-28
Payer: COMMERCIAL

## 2025-01-28 DIAGNOSIS — Z12.11 SCREENING FOR MALIGNANT NEOPLASM OF COLON: Primary | ICD-10-CM

## 2025-01-28 NOTE — TELEPHONE ENCOUNTER
Patient may still do the cologuard  ordered if had appendectomy but full colonoscopy will always be a better more specific test for colon cancer screening if she wants to switch to this.

## 2025-04-07 ENCOUNTER — TELEPHONE (OUTPATIENT)
Dept: GASTROENTEROLOGY | Facility: CLINIC | Age: 45
End: 2025-04-07
Payer: COMMERCIAL

## 2025-04-08 ENCOUNTER — OUTSIDE FACILITY SERVICE (OUTPATIENT)
Dept: GASTROENTEROLOGY | Facility: CLINIC | Age: 45
End: 2025-04-08
Payer: COMMERCIAL